# Patient Record
Sex: FEMALE | Race: WHITE
[De-identification: names, ages, dates, MRNs, and addresses within clinical notes are randomized per-mention and may not be internally consistent; named-entity substitution may affect disease eponyms.]

---

## 2022-01-09 ENCOUNTER — HOSPITAL ENCOUNTER (EMERGENCY)
Dept: HOSPITAL 93 - ER | Age: 57
Discharge: HOME | End: 2022-01-09
Payer: COMMERCIAL

## 2022-01-09 VITALS — BODY MASS INDEX: 41.23 KG/M2 | WEIGHT: 210 LBS | HEIGHT: 60 IN

## 2022-01-09 DIAGNOSIS — N39.0: Primary | ICD-10-CM

## 2022-05-22 ENCOUNTER — HOSPITAL ENCOUNTER (EMERGENCY)
Dept: HOSPITAL 93 - ER | Age: 57
Discharge: HOME | End: 2022-05-22
Payer: COMMERCIAL

## 2022-05-22 VITALS — BODY MASS INDEX: 41.95 KG/M2 | HEIGHT: 70 IN | WEIGHT: 293 LBS

## 2022-05-22 DIAGNOSIS — Z88.6: ICD-10-CM

## 2022-05-22 DIAGNOSIS — Z20.822: ICD-10-CM

## 2022-05-22 DIAGNOSIS — B34.9: Primary | ICD-10-CM

## 2022-05-22 DIAGNOSIS — R09.81: ICD-10-CM

## 2022-12-29 ENCOUNTER — HOSPITAL ENCOUNTER (INPATIENT)
Dept: HOSPITAL 93 - ER | Age: 57
LOS: 4 days | Discharge: HOME | DRG: 690 | End: 2023-01-02
Attending: INTERNAL MEDICINE | Admitting: INTERNAL MEDICINE
Payer: COMMERCIAL

## 2022-12-29 VITALS — WEIGHT: 205 LBS | HEIGHT: 60 IN | BODY MASS INDEX: 40.25 KG/M2

## 2022-12-29 DIAGNOSIS — N39.0: Primary | ICD-10-CM

## 2022-12-29 DIAGNOSIS — D72.828: ICD-10-CM

## 2022-12-29 DIAGNOSIS — Z20.822: ICD-10-CM

## 2022-12-29 DIAGNOSIS — K58.8: ICD-10-CM

## 2022-12-29 DIAGNOSIS — N12: ICD-10-CM

## 2022-12-29 DIAGNOSIS — M79.7: ICD-10-CM

## 2022-12-29 PROCEDURE — BW21ZZZ COMPUTERIZED TOMOGRAPHY (CT SCAN) OF ABDOMEN AND PELVIS: ICD-10-PCS | Performed by: EMERGENCY MEDICINE

## 2022-12-29 NOTE — NUR
PTE EVALAUDO POR DR. WATSON SE EJECUTA ORDEN EN VILLEDA TOTALIADAD, SE KERRIE
MUESTRA BAJO MEDIDAS ASEPTICAS Y SE CANALIZA CON ANGIO #18 MANO DERECHA TIENE
UN DRIP BAJANDO DE 0.9NSS BAJANDO A 150ML/HR
, SE
ORIENTA APTE SOBRE TODAS LAS INTERVENCION PTE/FAMILIAR REFIEREN ENTENDER.

## 2023-03-26 ENCOUNTER — HOSPITAL ENCOUNTER (INPATIENT)
Dept: HOSPITAL 93 - ER | Age: 58
LOS: 2 days | Discharge: HOME | DRG: 690 | End: 2023-03-28
Attending: INTERNAL MEDICINE | Admitting: INTERNAL MEDICINE
Payer: COMMERCIAL

## 2023-03-26 VITALS — HEIGHT: 61 IN | BODY MASS INDEX: 33.04 KG/M2 | WEIGHT: 175 LBS

## 2023-03-26 DIAGNOSIS — I10: ICD-10-CM

## 2023-03-26 DIAGNOSIS — N39.0: Primary | ICD-10-CM

## 2023-03-26 DIAGNOSIS — Z20.822: ICD-10-CM

## 2023-03-26 DIAGNOSIS — M79.7: ICD-10-CM

## 2023-08-13 ENCOUNTER — HOSPITAL ENCOUNTER (EMERGENCY)
Dept: HOSPITAL 93 - ER | Age: 58
Discharge: HOME | End: 2023-08-13
Payer: COMMERCIAL

## 2023-08-13 VITALS — WEIGHT: 202 LBS | BODY MASS INDEX: 39.66 KG/M2 | HEIGHT: 60 IN

## 2023-08-13 DIAGNOSIS — Z88.6: ICD-10-CM

## 2023-08-13 DIAGNOSIS — M79.7: ICD-10-CM

## 2023-08-13 DIAGNOSIS — N39.0: Primary | ICD-10-CM
